# Patient Record
Sex: MALE | Employment: FULL TIME
[De-identification: names, ages, dates, MRNs, and addresses within clinical notes are randomized per-mention and may not be internally consistent; named-entity substitution may affect disease eponyms.]

---

## 2023-06-26 ENCOUNTER — TRANSCRIBE ORDERS (OUTPATIENT)
Dept: OTHER | Age: 53
End: 2023-06-26

## 2023-06-26 ENCOUNTER — PRE VISIT (OUTPATIENT)
Dept: ONCOLOGY | Facility: CLINIC | Age: 53
End: 2023-06-26
Payer: COMMERCIAL

## 2023-06-26 DIAGNOSIS — D32.9 MENINGIOMA (H): Primary | ICD-10-CM

## 2023-06-26 NOTE — TELEPHONE ENCOUNTER
Liver Hemangioma  Action Taken  Date/Description  TJ   Warm Txr from [facility] June 26, 2023  3:18 PM   Spoke with PT in re to Dx.  [facility]  Most recent imaging was in 2021.  No Hx of cancer treatments or any malignant biopsies.    PT stated that this tumor has recently become painful and that [facility] has referred him here as there is nothing more they can do for him.

## 2023-06-27 ENCOUNTER — PATIENT OUTREACH (OUTPATIENT)
Dept: ONCOLOGY | Facility: CLINIC | Age: 53
End: 2023-06-27
Payer: COMMERCIAL

## 2023-06-27 NOTE — PROGRESS NOTES
Surgical Oncology RN Care Coordination Note:     Called and left a message with patient to call back and discuss request for a follow up with Dr. Payne. Per review with Dr. Payne, if patient is having symptoms would likely recommend follow up with IR and hepatology and surgery would not be indicated or recommending in the setting of his hemangioma. If patient is not having symptoms and just requesting follow up and possible surveillance of liver would defer to PCP or hepatology to complete. Will review with patient once he calls back and determine if patient needs visit with Dr. Payne.     Merry Duggan, RN, BSN  Care Coordinator

## 2023-06-29 ENCOUNTER — PATIENT OUTREACH (OUTPATIENT)
Dept: SURGERY | Facility: CLINIC | Age: 53
End: 2023-06-29
Payer: COMMERCIAL

## 2023-07-08 ENCOUNTER — HEALTH MAINTENANCE LETTER (OUTPATIENT)
Age: 53
End: 2023-07-08

## 2024-04-25 NOTE — PROGRESS NOTES
Surgical Oncology RN Care Coordination Note:     Called and left a second voicemail with patient to discuss request for appointment with Dr. Payne. Per review with Dr. Payne would need to know what patient is requesting to see Dr. Payne before scheduling. Provided my direct line and informed patient that further discussion would need to happen prior to scheduling.     Merry Duggan RN, BSN  Care Coordinator         
not applicable

## 2024-08-31 ENCOUNTER — HEALTH MAINTENANCE LETTER (OUTPATIENT)
Age: 54
End: 2024-08-31